# Patient Record
Sex: MALE | Race: WHITE | URBAN - METROPOLITAN AREA
[De-identification: names, ages, dates, MRNs, and addresses within clinical notes are randomized per-mention and may not be internally consistent; named-entity substitution may affect disease eponyms.]

---

## 2023-10-03 ENCOUNTER — APPOINTMENT (OUTPATIENT)
Dept: RADIOLOGY | Facility: HOSPITAL | Age: 33
End: 2023-10-03
Payer: COMMERCIAL

## 2023-10-03 ENCOUNTER — HOSPITAL ENCOUNTER (EMERGENCY)
Facility: HOSPITAL | Age: 33
Discharge: HOME | End: 2023-10-03
Attending: STUDENT IN AN ORGANIZED HEALTH CARE EDUCATION/TRAINING PROGRAM
Payer: COMMERCIAL

## 2023-10-03 VITALS
HEART RATE: 80 BPM | TEMPERATURE: 97.7 F | RESPIRATION RATE: 16 BRPM | HEIGHT: 67 IN | WEIGHT: 188 LBS | OXYGEN SATURATION: 95 % | BODY MASS INDEX: 29.51 KG/M2 | SYSTOLIC BLOOD PRESSURE: 160 MMHG | DIASTOLIC BLOOD PRESSURE: 100 MMHG

## 2023-10-03 DIAGNOSIS — N50.89 TESTICULAR CALCIFICATION: Primary | ICD-10-CM

## 2023-10-03 LAB
APPEARANCE UR: CLEAR
BILIRUB UR STRIP.AUTO-MCNC: NEGATIVE MG/DL
COLOR UR: YELLOW
GLUCOSE UR STRIP.AUTO-MCNC: NEGATIVE MG/DL
KETONES UR STRIP.AUTO-MCNC: NEGATIVE MG/DL
LEUKOCYTE ESTERASE UR QL STRIP.AUTO: NEGATIVE
NITRITE UR QL STRIP.AUTO: NEGATIVE
PH UR STRIP.AUTO: 5 [PH]
PROT UR STRIP.AUTO-MCNC: NEGATIVE MG/DL
RBC # UR STRIP.AUTO: NEGATIVE /UL
SP GR UR STRIP.AUTO: 1.02
UROBILINOGEN UR STRIP.AUTO-MCNC: <2 MG/DL

## 2023-10-03 PROCEDURE — 81003 URINALYSIS AUTO W/O SCOPE: CPT | Performed by: NURSE PRACTITIONER

## 2023-10-03 PROCEDURE — 76870 US EXAM SCROTUM: CPT | Performed by: STUDENT IN AN ORGANIZED HEALTH CARE EDUCATION/TRAINING PROGRAM

## 2023-10-03 PROCEDURE — 99284 EMERGENCY DEPT VISIT MOD MDM: CPT | Performed by: STUDENT IN AN ORGANIZED HEALTH CARE EDUCATION/TRAINING PROGRAM

## 2023-10-03 PROCEDURE — 99284 EMERGENCY DEPT VISIT MOD MDM: CPT | Mod: 25

## 2023-10-03 PROCEDURE — 87661 TRICHOMONAS VAGINALIS AMPLIF: CPT | Mod: CMCLAB,PARLAB | Performed by: NURSE PRACTITIONER

## 2023-10-03 PROCEDURE — 76870 US EXAM SCROTUM: CPT

## 2023-10-03 PROCEDURE — 87800 DETECT AGNT MULT DNA DIREC: CPT | Mod: CMCLAB,PARLAB | Performed by: NURSE PRACTITIONER

## 2023-10-03 PROCEDURE — 87800 DETECT AGNT MULT DNA DIREC: CPT | Mod: 59,CMCLAB,PARLAB | Performed by: NURSE PRACTITIONER

## 2023-10-03 RX ORDER — IBUPROFEN 600 MG/1
600 TABLET ORAL EVERY 6 HOURS PRN
Qty: 20 TABLET | Refills: 0 | Status: SHIPPED | OUTPATIENT
Start: 2023-10-03 | End: 2023-10-08

## 2023-10-03 ASSESSMENT — PAIN DESCRIPTION - PAIN TYPE: TYPE: ACUTE PAIN

## 2023-10-03 ASSESSMENT — COLUMBIA-SUICIDE SEVERITY RATING SCALE - C-SSRS
1. IN THE PAST MONTH, HAVE YOU WISHED YOU WERE DEAD OR WISHED YOU COULD GO TO SLEEP AND NOT WAKE UP?: NO
6. HAVE YOU EVER DONE ANYTHING, STARTED TO DO ANYTHING, OR PREPARED TO DO ANYTHING TO END YOUR LIFE?: NO
2. HAVE YOU ACTUALLY HAD ANY THOUGHTS OF KILLING YOURSELF?: NO

## 2023-10-03 ASSESSMENT — PAIN - FUNCTIONAL ASSESSMENT: PAIN_FUNCTIONAL_ASSESSMENT: 0-10

## 2023-10-03 ASSESSMENT — PAIN SCALES - GENERAL: PAINLEVEL_OUTOF10: 6

## 2023-10-03 ASSESSMENT — PAIN DESCRIPTION - LOCATION: LOCATION: SCROTUM

## 2023-10-03 NOTE — ED PROVIDER NOTES
"HPI   Chief Complaint   Patient presents with    Groin Swelling     PT. STATES RIGHT TESTICLE WAS PUSHED UP, STATES NOW BOTH TESTICLES \"HIGH AND TIGHT.\" PT. STATES UNCOMFORTABLE WHEN SITTING. DENIES RECENT TRAUMA. DENIES PENILE DISCHARGE. PT. STATES HAS BEEN HAVING THESE ISSUES OFF AND ON FOR COUPLE WEEKS.       Patient is a 33-year-old male with no significant past medical history reported presents ED today due to bilateral testicular tenderness.  Patient states his symptoms have been ongoing for the past 2 weeks, started with his right testicle and has progressed to being both testicles.  Patient states that he feels like neither 1 will fully descend.  Patient denies any penile discharge, or urinary symptoms.       used: No                        No data recorded                Patient History   History reviewed. No pertinent past medical history.  History reviewed. No pertinent surgical history.  No family history on file.  Social History     Tobacco Use    Smoking status: Not on file    Smokeless tobacco: Not on file   Substance Use Topics    Alcohol use: Not on file    Drug use: Not on file       Physical Exam   ED Triage Vitals [10/03/23 1054]   Temp Heart Rate Resp BP   36.5 °C (97.7 °F) 80 16 (!) 160/100      SpO2 Temp Source Heart Rate Source Patient Position   95 % Temporal -- Sitting      BP Location FiO2 (%)     Right arm --       Physical Exam  Vitals and nursing note reviewed. Exam conducted with a chaperone present.   Constitutional:       General: He is not in acute distress.     Appearance: He is well-developed.   HENT:      Head: Normocephalic and atraumatic.   Eyes:      Conjunctiva/sclera: Conjunctivae normal.   Cardiovascular:      Rate and Rhythm: Normal rate and regular rhythm.      Heart sounds: No murmur heard.  Pulmonary:      Effort: Pulmonary effort is normal. No respiratory distress.      Breath sounds: Normal breath sounds.   Abdominal:      Palpations: Abdomen is " soft.      Tenderness: There is no abdominal tenderness.   Genitourinary:     Pubic Area: No rash or pubic lice.       Penis: Normal and circumcised. No phimosis, paraphimosis, tenderness, discharge or lesions.       Testes: Cremasteric reflex is present.         Right: Tenderness present.         Left: Tenderness not present.   Musculoskeletal:         General: No swelling.      Cervical back: Neck supple.   Skin:     General: Skin is warm and dry.      Capillary Refill: Capillary refill takes less than 2 seconds.   Neurological:      Mental Status: He is alert.   Psychiatric:         Mood and Affect: Mood normal.         ED Course & MDM   ED Course as of 10/03/23 1347   Tue Oct 03, 2023   1344 Urinalysis with Reflex Microscopic  Negative for uti or dehydration. [WS]   1344 Trichomonas vaginalis, Amplified  pending [WS]   1344 C. trachomatis + N. gonorrhoeae, Amplified  pending [WS]   1345 US scrotum  1. No acute scrotal pathology.  2. Bilateral tiny intratesticular calcific foci measuring 0.2 cm each  (microlithiasis).  We advised patient to follow-up in 6-12 months by ultrasound to ensure stability   [WS]      ED Course User Index  [WS] Isaiah Olivas, APRN-CNP         Diagnoses as of 10/03/23 1347   Testicular calcification       Medical Decision Making  Differential diagnosis: Testicular torsion, epididymitis, hydrocele, UTI, STI.    Patient's imaging was reviewed and I discussed the results with the patient.  He will need to follow-up with urology for this.  Patient was prescribed anti-inflammatories for home for the discomfort.  Patient does have STI testing pending and we will call with these results if they are positive.    Amount and/or Complexity of Data Reviewed  Labs: ordered. Decision-making details documented in ED Course.  Radiology: ordered and independent interpretation performed. Decision-making details documented in ED Course.    Risk  OTC drugs.  Prescription drug  management.        Procedure  Procedures     WAQAS Zhou-CNP  10/03/23 1058       WAQAS Zhou-CNP  10/03/23 1138       WAQAS Zhou-CNP  10/03/23 1347       WAQAS Zhou-CNP  10/03/23 1348       WAQAS Zhou-CNP  10/03/23 1503

## 2023-10-04 LAB
C TRACH RRNA SPEC QL NAA+PROBE: NEGATIVE
N GONORRHOEA DNA SPEC QL PROBE+SIG AMP: NEGATIVE
T VAGINALIS RRNA SPEC QL NAA+PROBE: NEGATIVE